# Patient Record
Sex: FEMALE | Race: ASIAN | NOT HISPANIC OR LATINO | URBAN - METROPOLITAN AREA
[De-identification: names, ages, dates, MRNs, and addresses within clinical notes are randomized per-mention and may not be internally consistent; named-entity substitution may affect disease eponyms.]

---

## 2018-01-11 ENCOUNTER — OUTPATIENT (OUTPATIENT)
Dept: OUTPATIENT SERVICES | Facility: HOSPITAL | Age: 23
LOS: 1 days | End: 2018-01-11
Payer: COMMERCIAL

## 2018-01-11 DIAGNOSIS — Z00.00 ENCOUNTER FOR GENERAL ADULT MEDICAL EXAMINATION WITHOUT ABNORMAL FINDINGS: ICD-10-CM

## 2018-01-11 PROCEDURE — 84478 ASSAY OF TRIGLYCERIDES: CPT

## 2018-01-11 PROCEDURE — 80053 COMPREHEN METABOLIC PANEL: CPT

## 2018-01-11 PROCEDURE — 85027 COMPLETE CBC AUTOMATED: CPT

## 2018-01-11 PROCEDURE — 82465 ASSAY BLD/SERUM CHOLESTEROL: CPT

## 2018-01-11 PROCEDURE — 83718 ASSAY OF LIPOPROTEIN: CPT

## 2018-01-11 PROCEDURE — 83721 ASSAY OF BLOOD LIPOPROTEIN: CPT

## 2019-05-22 PROBLEM — Z00.00 ENCOUNTER FOR PREVENTIVE HEALTH EXAMINATION: Status: ACTIVE | Noted: 2019-05-22

## 2019-05-23 ENCOUNTER — APPOINTMENT (OUTPATIENT)
Dept: PEDIATRIC ORTHOPEDIC SURGERY | Facility: CLINIC | Age: 24
End: 2019-05-23
Payer: COMMERCIAL

## 2019-05-23 DIAGNOSIS — Z78.9 OTHER SPECIFIED HEALTH STATUS: ICD-10-CM

## 2019-05-23 DIAGNOSIS — M54.9 DORSALGIA, UNSPECIFIED: ICD-10-CM

## 2019-05-23 DIAGNOSIS — M41.125 ADOLESCENT IDIOPATHIC SCOLIOSIS, THORACOLUMBAR REGION: ICD-10-CM

## 2019-05-23 DIAGNOSIS — Z00.00 ENCOUNTER FOR GENERAL ADULT MEDICAL EXAMINATION W/OUT ABNORMAL FINDINGS: ICD-10-CM

## 2019-05-23 PROCEDURE — 72082 X-RAY EXAM ENTIRE SPI 2/3 VW: CPT

## 2019-05-23 PROCEDURE — 99204 OFFICE O/P NEW MOD 45 MIN: CPT | Mod: 25

## 2019-05-27 NOTE — PHYSICAL EXAM
[FreeTextEntry1] : General: Patient is awake and alert and in no acute distress . oriented to person, place, and time. well developed, well nourished, cooperative. \par \par Skin: The skin is intact, warm, pink, and dry over the area examined.  \par \par Eyes: normal conjunctiva, normal eyelids and pupils were equal and round. \par \par ENT: normal ears, normal nose and normal lips.\par \par Cardiovascular: There is brisk capillary refill in the digits of the affected extremity. They are symmetric pulses in the bilateral upper and lower extremities, positive peripheral pulses, brisk capillary refill, but no peripheral edema.\par \par Respiratory: The patient is in no apparent respiratory distress. They're taking full deep breaths without use of accessory muscles or evidence of audible wheezes or stridor without the use of a stethoscope, normal respiratory effort. \par \par Neurological: 5/5 motor strength in the main muscle groups of bilateral lower extremities, sensory intact in bilateral lower extremities. \par \par Musculoskeletal:. Examination of both the upper and lower extremities did not show any obvious abnormality.  There is no gross deformity.  Patient has full range of motion of both the hips, knees, ankles, wrists, elbows, and shoulders.  Neck range of motion is full and free without any pain or spasm.  \par \par Examination of the back reveals relatively level shoulders.  The pelvis is asymmetric with right hip significantly higher.  On forward bending, right thoracic prominence and left thoracolumbar prominence noted.  Patient is able to bend forward and touch the toes as well bend backwards without pain.  Lateral flexion is symmetrical and is pain free.  Straight leg raising test is free to more than 70 degrees. \par \par Neurological examination reveals a grade 5/5 muscle power.  Sensation is intact to crude touch and pinprick.  Deep tendon reflexes are 1+ with ankle jerk and knee jerk.  The plantars are bilaterally down going.  Superficial abdominal reflexes are symmetric and intact.  The biceps and triceps reflexes are 1+.  \par  \par There is no hairy patch, lipoma, sinus in the back.  There is no pes cavus, asymmetry of calves, significant leg length discrepancy or significant cafe-au-lait spots.\par \par Child is able to walk on tiptoes as well as heels without difficulty or pain. Child is able to jump and squat without difficulty.\par \par  \par

## 2019-05-27 NOTE — ASSESSMENT
[FreeTextEntry1] : Patient is 23 years of age, With scoliosis and back pain.  Options of observation with likely continue to progress and do to its magnitude have been discussed. The options of surgery were discussed. Patient understands that curves larger than 50°, based on natural history, tend to progress 1-2° /1 in adult life. Curves 90° or more can cause significant cardiac and pulmonary compromise. Large curves are likely at risk for back pain, the arthritis in her adult life. Surgical procedure discussed at length. Preoperative and postoperative instructions were reviewed. Preoperative workup also discussed. As for back pain, I have recommended daily back and core strengthening and postural support. Prescription for physical therapy provided. Activities as tolerated. She will call my office if she wishes to proceed with surgery.All questions answered, understanding verbalized. Parent and patient in agreement with plan of care.\par \par I, Melissa Villasenor, have acted as a scribe and documented the above information for Dr. Juarez\par \par The above documentation completed by the scribe is an accurate record of both my words and actions.\par

## 2019-05-27 NOTE — DATA REVIEWED
[de-identified] : AP and lateral full spine xr done today Revealing right thoracic curve measuring about 45° and left thoracolumbar curve measuring about 40°. Risser 5

## 2019-05-27 NOTE — HISTORY OF PRESENT ILLNESS
[Worsening] : worsening [4] : currently ~his/her~ pain is 4 out of 10 [Sitting] : sitting [Standing] : standing [Lifting] : lifting [FreeTextEntry1] : 23-year-old female, otherwise healthy presents today for evaluation of back pain. She reports scoliosis diagnosed in adolescence, treated by Dr. Guthrie. She was treated with brace for many yrs. She reports Curve measuring about 50°.She reports worsening of back pain particularly after working out at the gym, lifting weights and after working. She stands for about 6-7 hours per day at work. She reports pain left mid back and occasionally in the neck. She does not take pain medication. She denies extremity numbness, bowel or bladder dysfunction.

## 2019-05-27 NOTE — REASON FOR VISIT
[Initial Evaluation] : an initial evaluation [Patient] : patient [Mother] : mother [FreeTextEntry1] : Back pain, scoliosis

## 2019-11-27 ENCOUNTER — APPOINTMENT (OUTPATIENT)
Dept: SPINE | Facility: CLINIC | Age: 24
End: 2019-11-27
Payer: COMMERCIAL

## 2019-11-27 VITALS
BODY MASS INDEX: 27.6 KG/M2 | HEIGHT: 62 IN | OXYGEN SATURATION: 100 % | DIASTOLIC BLOOD PRESSURE: 66 MMHG | WEIGHT: 150 LBS | RESPIRATION RATE: 16 BRPM | SYSTOLIC BLOOD PRESSURE: 102 MMHG | HEART RATE: 65 BPM

## 2019-11-27 PROCEDURE — 99203 OFFICE O/P NEW LOW 30 MIN: CPT

## 2019-11-27 NOTE — HISTORY OF PRESENT ILLNESS
[> 3 months] : more  than 3 months [FreeTextEntry1] : Spinal Back pain [de-identified] : This is a 24 year old woman here for neurosurgical evaluation of scoliosis. She comes to Visit with her mother. She reports that she was initially diagnosed with scoliosis at age 11 and wore a prescribe BACK BRACE until age 19 without any major issues. She reports that her new job demands prolonged standing and her back symptoms began to accelerate. She denies any leg weakness or difficulty walking.

## 2019-11-27 NOTE — RESULTS/DATA
[FreeTextEntry1] : cervical-MILD ROTOSCOLIOSIS CONVEX LEFT CENTERED IN THE MID CERVICAL SPINE\par thoracic-35 DEGREE ROROSCOLIOSIS CONVEX RIGHT CENTERED IN THE MID THORACIC SPINE WITH NO SEGMENTATION OR INTRASPINAL ANOMALY\par lumbar- DEGENERATIVE ROTOSCOLIOSIS CONVEX LEFT CENTERED AT THE MID LUMBAR SPINE WITH NO SEGMENTAL OR INTTRASPINAL ANOMALY\par \par see report for details

## 2019-11-27 NOTE — ASSESSMENT
[FreeTextEntry1] : NO sURGERY RECOMMENDED AT THIS TIME.\par Follow up as needed.\par Education provided regarding plan of care.\par

## 2019-11-27 NOTE — DATA REVIEWED
[de-identified] : CERVICAL, THORACIC and LUMBAR 7/22/19 [facility] (See Report fir details)

## 2019-11-27 NOTE — PHYSICAL EXAM
[General Appearance - Alert] : alert [General Appearance - In No Acute Distress] : in no acute distress [Oriented To Time, Place, And Person] : oriented to person, place, and time [I: Normal Smell] : smell intact bilaterally [Cranial Nerves Optic (II)] : visual acuity intact bilaterally,  pupils equal round and reactive to light [Cranial Nerves Oculomotor (III)] : extraocular motion intact [Cranial Nerves Trigeminal (V)] : facial sensation intact symmetrically [Cranial Nerves Facial (VII)] : face symmetrical [Cranial Nerves Vestibulocochlear (VIII)] : hearing was intact bilaterally [Cranial Nerves Glossopharyngeal (IX)] : tongue and palate midline [Cranial Nerves Accessory (XI - Cranial And Spinal)] : head turning and shoulder shrug symmetric [Cranial Nerves Hypoglossal (XII)] : there was no tongue deviation with protrusion [Motor Tone] : muscle tone was normal in all four extremities [Motor Strength] : muscle strength was normal in all four extremities [Involuntary Movements] : no involuntary movements were seen [Sensation Tactile Decrease] : light touch was intact [Balance] : balance was intact [2+] : Patella left 2+ [Deformity] : deformity [Sclera] : the sclera and conjunctiva were normal [Outer Ear] : the ears and nose were normal in appearance [Neck Appearance] : the appearance of the neck was normal [Heart Rate And Rhythm] : heart rate was normal and rhythm regular [Abdomen Soft] : soft [Abnormal Walk] : normal gait [Skin Color & Pigmentation] : normal skin color and pigmentation

## 2020-01-04 ENCOUNTER — EMERGENCY (EMERGENCY)
Facility: HOSPITAL | Age: 25
LOS: 1 days | Discharge: HOME | End: 2020-01-04
Attending: EMERGENCY MEDICINE | Admitting: EMERGENCY MEDICINE
Payer: COMMERCIAL

## 2020-01-04 VITALS
HEIGHT: 62 IN | SYSTOLIC BLOOD PRESSURE: 109 MMHG | HEART RATE: 82 BPM | WEIGHT: 149.91 LBS | OXYGEN SATURATION: 99 % | RESPIRATION RATE: 16 BRPM | DIASTOLIC BLOOD PRESSURE: 74 MMHG | TEMPERATURE: 98 F

## 2020-01-04 PROCEDURE — 40804 REMOVAL FOREIGN BODY MOUTH: CPT

## 2020-01-04 PROCEDURE — 99283 EMERGENCY DEPT VISIT LOW MDM: CPT

## 2020-01-04 PROCEDURE — 99283 EMERGENCY DEPT VISIT LOW MDM: CPT | Mod: 25

## 2020-01-04 PROCEDURE — 40804 REMOVAL FOREIGN BODY MOUTH: CPT | Mod: LT

## 2020-01-04 RX ORDER — NEOMYCIN/POLYMYXIN B/HYDROCORT
2 SUSPENSION, DROPS(FINAL DOSAGE FORM)(ML) OTIC (EAR)
Qty: 1 | Refills: 0
Start: 2020-01-04 | End: 2020-01-13

## 2020-01-04 NOTE — ED ADULT NURSE NOTE - OBJECTIVE STATEMENT
patient came in ED from home brought in by Mom with c/o bleeding from the left ear since last night. patient states she came from [place] yesterday and when she went to bed last night noted slight bleeding from the left ear.

## 2020-01-04 NOTE — ED PROVIDER NOTE - NS HIV RISK FACTOR YES
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     
Declined

## 2020-01-04 NOTE — ED PROVIDER NOTE - OBJECTIVE STATEMENT
23 yo female with few days of congestion and slight sore throat, awoke with dull left ear with pain, used Q-Tip and started to bleed from ear so came here for evaluation. No headache. No fever or chills. No neck pain. No cough.

## 2020-01-04 NOTE — ED PROVIDER NOTE - PATIENT PORTAL LINK FT
You can access the FollowMyHealth Patient Portal offered by [facility] by registering at the following website: [email or web address]. By joining [facility]’s FollowMyHealth portal, you will also be able to view your health information using other applications (apps) compatible with our system.

## 2020-01-04 NOTE — ED PROVIDER NOTE - ENMT, MLM
Airway patent. External canal on left with blood clot>>>>>post removal revealed dull TM, No Perforation. External canal edematous. Pharynx normal.

## 2020-01-04 NOTE — ED PROVIDER NOTE - CONSTITUTIONAL, MLM
normal... Well appearing, female, awake, alert, oriented to person, place, time/situation and in no apparent distress.

## 2020-01-04 NOTE — ED ADULT NURSE NOTE - NSIMPLEMENTINTERV_GEN_ALL_ED
Implemented All Fall with Harm Risk Interventions:  [place] to call system. Call bell, personal items and telephone within reach. Instruct patient to call for assistance. Room bathroom lighting operational. Non-slip footwear when patient is off stretcher. Physically safe environment: no spills, clutter or unnecessary equipment. Stretcher in lowest position, wheels locked, appropriate side rails in place. Provide visual cue, wrist band, yellow gown, etc. Monitor gait and stability. Monitor for mental status changes and reorient to person, place, and time. Review medications for side effects contributing to fall risk. Reinforce activity limits and safety measures with patient and family. Provide visual clues: red socks.

## 2020-01-04 NOTE — ED PROVIDER NOTE - CARE PROVIDER_API CALL
Macario Mcdowell)  Facial Plastic and Reconstructive Surgery; Otolaryngology  [address] [place] [address]  Phone: [phone]  Fax: [phone]  Follow Up Time:

## 2020-01-04 NOTE — ED PROVIDER NOTE - NSFOLLOWUPINSTRUCTIONS_ED_ALL_ED_FT
Rest  Augmentin 1-tablet every 12-hours for the next 10-days  Corticosporin Ear Drops, 2-drops to left ear every 8-hours for the next 10-days  Follow-up with your doctor this week  Return here if needed

## 2020-11-03 ENCOUNTER — TRANSCRIPTION ENCOUNTER (OUTPATIENT)
Age: 25
End: 2020-11-03

## 2021-09-01 ENCOUNTER — TRANSCRIPTION ENCOUNTER (OUTPATIENT)
Age: 26
End: 2021-09-01

## 2021-09-01 ENCOUNTER — OUTPATIENT (OUTPATIENT)
Dept: OUTPATIENT SERVICES | Facility: HOSPITAL | Age: 26
LOS: 1 days | End: 2021-09-01
Payer: COMMERCIAL

## 2021-09-01 DIAGNOSIS — R79.89 OTHER SPECIFIED ABNORMAL FINDINGS OF BLOOD CHEMISTRY: ICD-10-CM

## 2021-09-01 DIAGNOSIS — R07.9 CHEST PAIN, UNSPECIFIED: ICD-10-CM

## 2021-09-01 DIAGNOSIS — Z00.00 ENCOUNTER FOR GENERAL ADULT MEDICAL EXAMINATION WITHOUT ABNORMAL FINDINGS: ICD-10-CM

## 2021-09-01 DIAGNOSIS — Z01.818 ENCOUNTER FOR OTHER PREPROCEDURAL EXAMINATION: ICD-10-CM

## 2021-09-01 DIAGNOSIS — E55.9 VITAMIN D DEFICIENCY, UNSPECIFIED: ICD-10-CM

## 2021-09-01 PROCEDURE — 80053 COMPREHEN METABOLIC PANEL: CPT

## 2021-09-01 PROCEDURE — 84443 ASSAY THYROID STIM HORMONE: CPT

## 2021-09-01 PROCEDURE — 82306 VITAMIN D 25 HYDROXY: CPT

## 2021-09-01 PROCEDURE — 80061 LIPID PANEL: CPT

## 2021-09-01 PROCEDURE — 82607 VITAMIN B-12: CPT

## 2021-09-01 PROCEDURE — 85025 COMPLETE CBC W/AUTO DIFF WBC: CPT

## 2021-09-01 PROCEDURE — 84436 ASSAY OF TOTAL THYROXINE: CPT

## 2021-09-01 PROCEDURE — 82746 ASSAY OF FOLIC ACID SERUM: CPT

## 2021-09-01 PROCEDURE — 81001 URINALYSIS AUTO W/SCOPE: CPT

## 2021-09-01 PROCEDURE — 84480 ASSAY TRIIODOTHYRONINE (T3): CPT

## 2021-09-01 PROCEDURE — 36415 COLL VENOUS BLD VENIPUNCTURE: CPT

## 2022-07-17 ENCOUNTER — NON-APPOINTMENT (OUTPATIENT)
Age: 27
End: 2022-07-17

## 2022-07-19 ENCOUNTER — EMERGENCY (EMERGENCY)
Facility: HOSPITAL | Age: 27
LOS: 1 days | Discharge: HOME | End: 2022-07-19
Attending: EMERGENCY MEDICINE | Admitting: EMERGENCY MEDICINE
Payer: COMMERCIAL

## 2022-07-19 VITALS
RESPIRATION RATE: 16 BRPM | OXYGEN SATURATION: 97 % | SYSTOLIC BLOOD PRESSURE: 100 MMHG | DIASTOLIC BLOOD PRESSURE: 79 MMHG | WEIGHT: 160.06 LBS | HEART RATE: 83 BPM | TEMPERATURE: 98 F | HEIGHT: 62 IN

## 2022-07-19 PROCEDURE — 99282 EMERGENCY DEPT VISIT SF MDM: CPT

## 2022-07-19 NOTE — ED ADULT TRIAGE NOTE - INTERNATIONAL TRAVEL
Kwabena Rodriguez called requesting a call back  She would like to schedule with another provider    [phone]
No

## 2022-07-19 NOTE — ED PROVIDER NOTE - NSFOLLOWUPINSTRUCTIONS_ED_ALL_ED_FT
1) Follow-up with your Primary Medical Doctor or referred doctor. Call today / next business day for prompt follow-up.  2) Return to Emergency room for any worsening or persistent pain, weakness, fever, dizziness, passing out, difficulty breathing or any other concerning symptoms.  3) See attached instruction sheets for additional information, including information regarding signs and symptoms to look out for, reasons to seek immediate care and other important instructions.  4) Continue your ear drops and your zithromax as prescribued  5) Follow-up with ENT as discussed

## 2022-07-19 NOTE — ED PROVIDER NOTE - PATIENT PORTAL LINK FT
You can access the FollowMyHealth Patient Portal offered by [facility] by registering at the following website: [email or web address]. By joining [facility]’s FollowMyHealth portal, you will also be able to view your health information using other applications (apps) compatible with our system.

## 2022-07-19 NOTE — ED ADULT TRIAGE NOTE - PAIN RATING/NUMBER SCALE (0-10): REST
[Normal] : affect was normal and insight and judgment were intact [de-identified] : mild maxillary sinus pressure, boggy nasal mucosa, mod pharyngeal erythema 4

## 2022-07-19 NOTE — ED PROVIDER NOTE - CHPI ED SYMPTOMS NEG
no blurred vision/no fever/no loss of consciousness/no syncope/no vomiting/no weakness/no change in level of consciousness/no chills

## 2022-07-19 NOTE — ED PROVIDER NOTE - RECENT EXPOSURE TO
Subjective:      S:  Lindsay Coulter is a 37 y.o.  female  @ She is 14w6d with an CROW of Estimated Date of Delivery: 21 based off of US who presents for her new OB exam.      Her OB hx includes  x1 in .   History of HSV I or II in self or partner: no  History of STIs in self or partner: no  History of Thyroid problems: no    NO ER visits or previous care in this pregnancy. She has no complaints.  Desires AFP.  Declines CF.Will wait for results fo AFP and US before deciding on MFM referral for AMA.  Reports no FM, VB, LOF, or cramping.  Denies dysuria, vaginal DC.  Pt is  and lives with family. FOB is involved. She is currently working as , discussed heavy lifting and chemical exposure.  Pregnancy is planned.    O:    Vitals:    10/29/20 1331   BP: 110/64   Weight: 78.7 kg (173 lb 9.6 oz)    See H&P Prenatal Physical.  Wet mount: not indicated        FHTs: 160        Fundal ht: 15cm     A:   1.  IUP @ 14w6d CROW: Estimated Date of Delivery: 21 per LMP         2.  S=D        3.    Patient Active Problem List    Diagnosis Date Noted   • Normal labor 2013   • GBS (group B streptococcus) UTI complicating pregnancy 2013   • Supervision of normal first pregnancy 2013         P:  1.  GC/CT done. Pap done.         2.  Prenatal labs and UDS ordered - lab slip given        3.  Discussed diet and exercise during pregnancy. Encouraged good nutrition, and daily exercise including walking or swimming. Discussed expected weight gain during pregnancy.              4.  Discussed adequate hydration during pregnancy.        5.  NOB packet given        6.  Return to office in 4 wks        7.  Complete OB US in 5-6 wks        8.  Pregnancy guide not available        9.  Childbirth education discussed. Not a candidate for Centering Pregnancy       10. Flu vaccine today    HPI    Review of Systems   Constitutional: Negative.    HENT: Negative.    Eyes: Negative.     Respiratory: Negative.    Cardiovascular: Negative.    Gastrointestinal: Negative.    Genitourinary: Negative.    Musculoskeletal: Negative.    Skin: Negative.    Neurological: Negative.    Endo/Heme/Allergies: Negative.    Psychiatric/Behavioral: Negative.           Objective:     /64   Wt 78.7 kg (173 lb 9.6 oz)   LMP 07/17/2020 (Exact Date)   BMI 29.80 kg/m²      Physical Exam  Vitals signs and nursing note reviewed.   Constitutional:       Appearance: She is well-developed.   Neck:      Musculoskeletal: Normal range of motion and neck supple.   Cardiovascular:      Rate and Rhythm: Normal rate and regular rhythm.      Heart sounds: Normal heart sounds.   Pulmonary:      Effort: Pulmonary effort is normal.      Breath sounds: Normal breath sounds.   Abdominal:      Palpations: Abdomen is soft.   Genitourinary:     Vagina: Normal.      Comments: Uterus enlarged, c/w 15 wk ga  Musculoskeletal: Normal range of motion.   Skin:     General: Skin is warm and dry.   Neurological:      Mental Status: She is alert and oriented to person, place, and time.      Deep Tendon Reflexes: Reflexes are normal and symmetric.   Psychiatric:         Behavior: Behavior normal.         Thought Content: Thought content normal.         Judgment: Judgment normal.                 Assessment/Plan:        1. Encounter for supervision of other normal pregnancy in second trimester    - THINPREP PAP W/HPV AND CTNG; Future  - US-OB 2ND 3RD TRI COMPLETE; Future  - URINE DRUG SCREEN W/CONF (AR); Future  - PRENATAL PANEL 3+HIV+HCV; Future  - AFP TETRA; Future  - HEP C VIRUS ANTIBODY; Future  - Influenza Vaccine Quad Injection (PF)    2. Encounter for supervision of other normal pregnancy in first trimester    - POCT Urinalysis     none known

## 2022-07-19 NOTE — ED PROVIDER NOTE - OBJECTIVE STATEMENT
27-year-old female with no significant past medical history presents with resolving COVID infection.  Patient has had URI symptoms for past 6 days.  Patient states that is much improved now.  Patient has been having left ear pain over the past 2 days.  Patient was started on oral Zithromax, and topical Cortisporin.  Patient complains of feeling of clogged left ear status post putting the eardrops into the ear.  No acute pain to the ear.  No fevers or chills.  No chest pain or shortness of breath.  No weakness or dizziness.  No other acute complaints at this time.  Patient is vaccinated for COVID.

## 2022-07-19 NOTE — ED PROVIDER NOTE - CARE PROVIDER_API CALL
Rosalia Gallagher ()  Internal Medicine  [address] [place] [address]  Phone: [phone]  Fax: [phone]  Follow Up Time:     Jun Saldivar)  [facility] [address]  [place] [address]  Phone: [phone]  Fax: [phone]  Follow Up Time:

## 2022-07-19 NOTE — ED PROVIDER NOTE - CLINICAL SUMMARY MEDICAL DECISION MAKING FREE TEXT BOX
Left side otitis externa just recently started on antibiotic drops.  Patient also on oral antibiotics.  Patient with resolving COVID.  Patient to continue medications as prescribed, outpatient follow-up with ENT.

## 2022-07-19 NOTE — ED PROVIDER NOTE - ENMT, MLM
Airway patent, Nasal mucosa clear. Mouth with normal mucosa. Throat has no vesicles, no oropharyngeal exudates and uvula is midline.  R TM partially clogged, no acute infxn. L TM not visualized, diffuse otitis externa, although open. Nl mastoid nl.

## 2022-07-19 NOTE — ED PROVIDER NOTE - CARE PROVIDERS DIRECT ADDRESSES
,[email or web address]

## 2022-10-10 DIAGNOSIS — U07.1 COVID-19: ICD-10-CM

## 2023-01-18 ENCOUNTER — APPOINTMENT (OUTPATIENT)
Dept: OTOLARYNGOLOGY | Facility: CLINIC | Age: 28
End: 2023-01-18
Payer: COMMERCIAL

## 2023-01-18 ENCOUNTER — NON-APPOINTMENT (OUTPATIENT)
Age: 28
End: 2023-01-18

## 2023-01-18 VITALS
HEIGHT: 62 IN | BODY MASS INDEX: 33.13 KG/M2 | HEART RATE: 91 BPM | WEIGHT: 180 LBS | SYSTOLIC BLOOD PRESSURE: 106 MMHG | DIASTOLIC BLOOD PRESSURE: 71 MMHG

## 2023-01-18 DIAGNOSIS — L30.9 DERMATITIS, UNSPECIFIED: ICD-10-CM

## 2023-01-18 DIAGNOSIS — H61.20 IMPACTED CERUMEN, UNSPECIFIED EAR: ICD-10-CM

## 2023-01-18 PROCEDURE — 69210 REMOVE IMPACTED EAR WAX UNI: CPT

## 2023-01-18 PROCEDURE — 99204 OFFICE O/P NEW MOD 45 MIN: CPT | Mod: 25

## 2023-01-18 NOTE — REVIEW OF SYSTEMS
[Seasonal Allergies] : seasonal allergies [Ear Pain] : ear pain [Ear Itch] : ear itch [Negative] : Heme/Lymph [Patient Intake Form Reviewed] : Patient intake form was reviewed

## 2023-01-18 NOTE — PHYSICAL EXAM
[de-identified] : LU IMPACTED CERUMEN REMOVED/ CANAL IRRITATED/ ECZEMA [Normal] : mucosa is normal [Midline] : trachea located in midline position

## 2023-01-19 RX ORDER — MOMETASONE FUROATE 1 MG/ML
0.1 SOLUTION TOPICAL
Qty: 1 | Refills: 3 | Status: ACTIVE | COMMUNITY
Start: 2023-01-19 | End: 1900-01-01

## 2023-05-22 ENCOUNTER — EMERGENCY (EMERGENCY)
Facility: HOSPITAL | Age: 28
LOS: 1 days | Discharge: PSYCHIATRIC OR SUBSTANCE USE FACILITY | End: 2023-05-22
Attending: EMERGENCY MEDICINE | Admitting: EMERGENCY MEDICINE
Payer: COMMERCIAL

## 2023-05-22 VITALS
HEART RATE: 100 BPM | TEMPERATURE: 98 F | SYSTOLIC BLOOD PRESSURE: 114 MMHG | DIASTOLIC BLOOD PRESSURE: 79 MMHG | RESPIRATION RATE: 20 BRPM | OXYGEN SATURATION: 97 %

## 2023-05-22 VITALS
OXYGEN SATURATION: 97 % | HEART RATE: 101 BPM | RESPIRATION RATE: 19 BRPM | HEIGHT: 62 IN | TEMPERATURE: 98 F | SYSTOLIC BLOOD PRESSURE: 114 MMHG | DIASTOLIC BLOOD PRESSURE: 71 MMHG | WEIGHT: 191.36 LBS

## 2023-05-22 DIAGNOSIS — F32.A DEPRESSION, UNSPECIFIED: ICD-10-CM

## 2023-05-22 LAB
ALBUMIN SERPL ELPH-MCNC: 3.6 G/DL — SIGNIFICANT CHANGE UP (ref 3.3–5)
ALP SERPL-CCNC: 81 U/L — SIGNIFICANT CHANGE UP (ref 30–120)
ALT FLD-CCNC: 22 U/L DA — SIGNIFICANT CHANGE UP (ref 10–60)
AMPHET UR-MCNC: NEGATIVE — SIGNIFICANT CHANGE UP
ANION GAP SERPL CALC-SCNC: 11 MMOL/L — SIGNIFICANT CHANGE UP (ref 5–17)
APAP SERPL-MCNC: <1 UG/ML — LOW (ref 10–30)
APPEARANCE UR: ABNORMAL
AST SERPL-CCNC: 17 U/L — SIGNIFICANT CHANGE UP (ref 10–40)
BACTERIA # UR AUTO: ABNORMAL /HPF
BARBITURATES UR SCN-MCNC: NEGATIVE — SIGNIFICANT CHANGE UP
BASE EXCESS BLDV CALC-SCNC: -6.2 MMOL/L — LOW (ref -2–3)
BASOPHILS # BLD AUTO: 0.03 K/UL — SIGNIFICANT CHANGE UP (ref 0–0.2)
BASOPHILS NFR BLD AUTO: 0.3 % — SIGNIFICANT CHANGE UP (ref 0–2)
BENZODIAZ UR-MCNC: NEGATIVE — SIGNIFICANT CHANGE UP
BILIRUB SERPL-MCNC: 0.2 MG/DL — SIGNIFICANT CHANGE UP (ref 0.2–1.2)
BILIRUB UR-MCNC: NEGATIVE — SIGNIFICANT CHANGE UP
BLOOD GAS COMMENTS, VENOUS: SIGNIFICANT CHANGE UP
BUN SERPL-MCNC: 10 MG/DL — SIGNIFICANT CHANGE UP (ref 7–23)
CALCIUM SERPL-MCNC: 9.1 MG/DL — SIGNIFICANT CHANGE UP (ref 8.4–10.5)
CHLORIDE SERPL-SCNC: 105 MMOL/L — SIGNIFICANT CHANGE UP (ref 96–108)
CO2 SERPL-SCNC: 24 MMOL/L — SIGNIFICANT CHANGE UP (ref 22–31)
COCAINE METAB.OTHER UR-MCNC: NEGATIVE — SIGNIFICANT CHANGE UP
COLOR SPEC: YELLOW — SIGNIFICANT CHANGE UP
CREAT SERPL-MCNC: 0.85 MG/DL — SIGNIFICANT CHANGE UP (ref 0.5–1.3)
DIFF PNL FLD: ABNORMAL
EGFR: 97 ML/MIN/1.73M2 — SIGNIFICANT CHANGE UP
EOSINOPHIL # BLD AUTO: 0.15 K/UL — SIGNIFICANT CHANGE UP (ref 0–0.5)
EOSINOPHIL NFR BLD AUTO: 1.5 % — SIGNIFICANT CHANGE UP (ref 0–6)
EPI CELLS # UR: PRESENT
ETHANOL SERPL-MCNC: <3 MG/DL — SIGNIFICANT CHANGE UP (ref 0–3)
GAS PNL BLDV: SIGNIFICANT CHANGE UP
GLUCOSE SERPL-MCNC: 130 MG/DL — HIGH (ref 70–99)
GLUCOSE UR QL: NEGATIVE MG/DL — SIGNIFICANT CHANGE UP
HCG UR QL: NEGATIVE — SIGNIFICANT CHANGE UP
HCO3 BLDV-SCNC: 19 MMOL/L — LOW (ref 22–29)
HCT VFR BLD CALC: 39.4 % — SIGNIFICANT CHANGE UP (ref 34.5–45)
HGB BLD-MCNC: 12.9 G/DL — SIGNIFICANT CHANGE UP (ref 11.5–15.5)
IMM GRANULOCYTES NFR BLD AUTO: 0.4 % — SIGNIFICANT CHANGE UP (ref 0–0.9)
KETONES UR-MCNC: 15 MG/DL
LEUKOCYTE ESTERASE UR-ACNC: ABNORMAL
LYMPHOCYTES # BLD AUTO: 1.61 K/UL — SIGNIFICANT CHANGE UP (ref 1–3.3)
LYMPHOCYTES # BLD AUTO: 15.9 % — SIGNIFICANT CHANGE UP (ref 13–44)
MCHC RBC-ENTMCNC: 28.2 PG — SIGNIFICANT CHANGE UP (ref 27–34)
MCHC RBC-ENTMCNC: 32.7 GM/DL — SIGNIFICANT CHANGE UP (ref 32–36)
MCV RBC AUTO: 86.2 FL — SIGNIFICANT CHANGE UP (ref 80–100)
METHADONE UR-MCNC: NEGATIVE — SIGNIFICANT CHANGE UP
MONOCYTES # BLD AUTO: 0.49 K/UL — SIGNIFICANT CHANGE UP (ref 0–0.9)
MONOCYTES NFR BLD AUTO: 4.8 % — SIGNIFICANT CHANGE UP (ref 2–14)
NEUTROPHILS # BLD AUTO: 7.8 K/UL — HIGH (ref 1.8–7.4)
NEUTROPHILS NFR BLD AUTO: 77.1 % — HIGH (ref 43–77)
NITRITE UR-MCNC: NEGATIVE — SIGNIFICANT CHANGE UP
NRBC # BLD: 0 /100 WBCS — SIGNIFICANT CHANGE UP (ref 0–0)
OPIATES UR-MCNC: NEGATIVE — SIGNIFICANT CHANGE UP
PCO2 BLDV: 34 MMHG — LOW (ref 39–42)
PCP SPEC-MCNC: SIGNIFICANT CHANGE UP
PCP UR-MCNC: NEGATIVE — SIGNIFICANT CHANGE UP
PH BLDV: 7.36 — SIGNIFICANT CHANGE UP (ref 7.32–7.43)
PH UR: 6 — SIGNIFICANT CHANGE UP (ref 5–8)
PLATELET # BLD AUTO: 374 K/UL — SIGNIFICANT CHANGE UP (ref 150–400)
PO2 BLDV: 97 MMHG — HIGH (ref 25–45)
POTASSIUM SERPL-MCNC: 4 MMOL/L — SIGNIFICANT CHANGE UP (ref 3.5–5.3)
POTASSIUM SERPL-SCNC: 4 MMOL/L — SIGNIFICANT CHANGE UP (ref 3.5–5.3)
PROT SERPL-MCNC: 8.3 G/DL — SIGNIFICANT CHANGE UP (ref 6–8.3)
PROT UR-MCNC: 30 MG/DL
RBC # BLD: 4.57 M/UL — SIGNIFICANT CHANGE UP (ref 3.8–5.2)
RBC # FLD: 12 % — SIGNIFICANT CHANGE UP (ref 10.3–14.5)
RBC CASTS # UR COMP ASSIST: 1 /HPF — SIGNIFICANT CHANGE UP (ref 0–4)
SALICYLATES SERPL-MCNC: 1 MG/DL — LOW (ref 2.8–20)
SAO2 % BLDV: 98.2 % — HIGH (ref 67–88)
SARS-COV-2 RNA SPEC QL NAA+PROBE: SIGNIFICANT CHANGE UP
SODIUM SERPL-SCNC: 140 MMOL/L — SIGNIFICANT CHANGE UP (ref 135–145)
SP GR SPEC: 1.03 — SIGNIFICANT CHANGE UP (ref 1–1.03)
THC UR QL: NEGATIVE — SIGNIFICANT CHANGE UP
UROBILINOGEN FLD QL: 0.2 MG/DL — SIGNIFICANT CHANGE UP (ref 0.2–1)
WBC # BLD: 10.12 K/UL — SIGNIFICANT CHANGE UP (ref 3.8–10.5)
WBC # FLD AUTO: 10.12 K/UL — SIGNIFICANT CHANGE UP (ref 3.8–10.5)
WBC UR QL: 3 /HPF — SIGNIFICANT CHANGE UP (ref 0–5)

## 2023-05-22 PROCEDURE — 99285 EMERGENCY DEPT VISIT HI MDM: CPT

## 2023-05-22 PROCEDURE — 81025 URINE PREGNANCY TEST: CPT

## 2023-05-22 PROCEDURE — 96375 TX/PRO/DX INJ NEW DRUG ADDON: CPT

## 2023-05-22 PROCEDURE — 82803 BLOOD GASES ANY COMBINATION: CPT

## 2023-05-22 PROCEDURE — 96361 HYDRATE IV INFUSION ADD-ON: CPT

## 2023-05-22 PROCEDURE — 36415 COLL VENOUS BLD VENIPUNCTURE: CPT

## 2023-05-22 PROCEDURE — 96374 THER/PROPH/DIAG INJ IV PUSH: CPT

## 2023-05-22 PROCEDURE — 87635 SARS-COV-2 COVID-19 AMP PRB: CPT

## 2023-05-22 PROCEDURE — 81001 URINALYSIS AUTO W/SCOPE: CPT

## 2023-05-22 PROCEDURE — 80307 DRUG TEST PRSMV CHEM ANLYZR: CPT

## 2023-05-22 PROCEDURE — 85025 COMPLETE CBC W/AUTO DIFF WBC: CPT

## 2023-05-22 PROCEDURE — 80053 COMPREHEN METABOLIC PANEL: CPT

## 2023-05-22 PROCEDURE — 90792 PSYCH DIAG EVAL W/MED SRVCS: CPT | Mod: 95

## 2023-05-22 PROCEDURE — 99285 EMERGENCY DEPT VISIT HI MDM: CPT | Mod: 25

## 2023-05-22 PROCEDURE — 93010 ELECTROCARDIOGRAM REPORT: CPT

## 2023-05-22 PROCEDURE — 93005 ELECTROCARDIOGRAM TRACING: CPT

## 2023-05-22 RX ORDER — ONDANSETRON 8 MG/1
4 TABLET, FILM COATED ORAL ONCE
Refills: 0 | Status: COMPLETED | OUTPATIENT
Start: 2023-05-22 | End: 2023-05-22

## 2023-05-22 RX ORDER — PANTOPRAZOLE SODIUM 20 MG/1
40 TABLET, DELAYED RELEASE ORAL ONCE
Refills: 0 | Status: COMPLETED | OUTPATIENT
Start: 2023-05-22 | End: 2023-05-22

## 2023-05-22 RX ORDER — SODIUM CHLORIDE 9 MG/ML
1000 INJECTION INTRAMUSCULAR; INTRAVENOUS; SUBCUTANEOUS ONCE
Refills: 0 | Status: COMPLETED | OUTPATIENT
Start: 2023-05-22 | End: 2023-05-22

## 2023-05-22 RX ADMIN — ONDANSETRON 4 MILLIGRAM(S): 8 TABLET, FILM COATED ORAL at 06:19

## 2023-05-22 RX ADMIN — SODIUM CHLORIDE 1000 MILLILITER(S): 9 INJECTION INTRAMUSCULAR; INTRAVENOUS; SUBCUTANEOUS at 03:05

## 2023-05-22 RX ADMIN — SODIUM CHLORIDE 1000 MILLILITER(S): 9 INJECTION INTRAMUSCULAR; INTRAVENOUS; SUBCUTANEOUS at 04:05

## 2023-05-22 RX ADMIN — SODIUM CHLORIDE 1000 MILLILITER(S): 9 INJECTION INTRAMUSCULAR; INTRAVENOUS; SUBCUTANEOUS at 08:29

## 2023-05-22 RX ADMIN — PANTOPRAZOLE SODIUM 40 MILLIGRAM(S): 20 TABLET, DELAYED RELEASE ORAL at 08:29

## 2023-05-22 NOTE — ED BEHAVIORAL HEALTH ASSESSMENT NOTE - HPI (INCLUDE ILLNESS QUALITY, SEVERITY, DURATION, TIMING, CONTEXT, MODIFYING FACTORS, ASSOCIATED SIGNS AND SYMPTOMS)
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 To note patient has no IP hospitalization,  no previous SA, no previous self harm, no legal/violence hx, no substance use hx.      Patient states she has been having a rough time over the past few days and having suicidal thoughts. She states about 24 hours ago she took 16 advils and about 8 hours ago she took 30 advils. She states she had some N but currently has no sx. She reports stressors of family, relationships, and school. She reports she feels depressed. Reports sleep, energy, appetite all vary with medical school. Reports recent SI. Reports high internal anxiety. Reports taking Zoloft 50mg for the past month. She reports she was feeling hopeless and thus restarted on Zoloft about a month ago. She is agreeable to admission.      Patient verbally consents to talk to father.

## 2023-05-22 NOTE — ED ADULT NURSE NOTE - OBJECTIVE STATEMENT
Pt p/w stating that she took 16 OTC advil pills approximately 24hrs ago, and took another 30 pills about 9 hrs ago, attempting to kill herself. Pt p/w stating that she took 16 OTC advil pills approximately 24hrs ago, and took another 30 pills about 9 hrs ago, attempting to kill herself. pt stated that she has been depressed for the past 1-2months, because of a lot of stress from school, family, "everything just happened all at the same time". pt I on sertraline, last seen psychiatrist 3 days ago, with hx of possible depression/excessive disorder.

## 2023-05-22 NOTE — ED BEHAVIORAL HEALTH ASSESSMENT NOTE - OTHER PAST PSYCHIATRIC HISTORY (INCLUDE DETAILS REGARDING ONSET, COURSE OF ILLNESS, INPATIENT/OUTPATIENT TREATMENT)
Previous Dx: Anxiety, Depression     Previous Med Trials: just zoloft     Previous IP treatment: denies     Previous SA: denies prior; this ED visit in May 2023 for OD on Advil     Self harming: denies     Current MH treatment: Dr. Leger since Nov 2021     Violence/Legal: denies

## 2023-05-22 NOTE — ED ADULT TRIAGE NOTE - CHIEF COMPLAINT QUOTE
I took 16 Advil 200mg over 24 hours ago and nothing happened around 0000 yesterday. I took about 30 pills of Advil 8 hours ago; with intentions to hurt myself. Pt following with psychiatry. I am having very bad thoughts with school and family stuff. Pt currently in med school

## 2023-05-22 NOTE — ED PROVIDER NOTE - PROGRESS NOTE DETAILS
Patient medically cleared for psychiatric admission. No medical follow up indicated for ingestion telepsych relates pt to be xferred to [facility] under dr rodrigez

## 2023-05-22 NOTE — ED BEHAVIORAL HEALTH NOTE - BEHAVIORAL HEALTH NOTE
===================     PRE-HOSPITAL COURSE     ===================     SOURCE: RN and secondhand ED documentation      DETAILS: Pt self-presented with parent      ============     ED COURSE     ============     SOURCE: RN and secondhand ED documentation      ARRIVAL: Pt self-presented with parent     BELONGINGS: No belongings of note. All belongings were provided to hospital security/dad, and patient currently in a gown with a 1:1 staff member.     BEHAVIOR: Per RN pt is calm, cooperative and in behavioral control. Pt reported to RN she is presenting due to feeling very depressed and stressed out. RN reports pt attempted suicide via OD. RN reports pt presents AOx4, good eye contact and good grooming/hygiene.      TREATMENT: Sodium Chloride 1000 ml at 3:00a     VISITORS: Mom at bedside

## 2023-05-22 NOTE — ED PROVIDER NOTE - OBJECTIVE STATEMENT
Patient presents to emergency department stating she took overdose of Advil.  Patient states she has been having thoughts of harming herself.  Patient also been going on for 1 to 2 months but she has been suppressing them.  They have become more pronounced in the last couple of days.  Patient has a history of possible depression or excessive disorder and is followed by a Dr. Riley of psychiatry.  Patient is on sertraline.  Last saw her psychiatrist 3 days ago.  Patient denies any prior suicidal attempts.  Patient is having some abdominal bloating with mild nausea but denies any other symptoms.  Patient states that she took 16 over-the-counter Advil's approximately 24 hours ago.  States she took another 30 pills approximately, around 9 hours ago.  Denies any other ingestion.  Patient told her parents that she was having the abdominal symptoms and admitted to the ingestion.  They brought her to the emergency department. Patient presents to emergency department stating she took overdose of Advil.  Patient states she has been having thoughts of harming herself.  Patient also been going on for 1 to 2 months but she has been suppressing them.  They have become more pronounced in the last couple of days.  Patient has a history of possible depression or excessive disorder and is followed by a Dr. Leger of psychiatry.  Patient is on sertraline.  Last saw her psychiatrist 3 days ago.  Patient denies any prior suicidal attempts.  Patient is having some abdominal bloating with mild nausea but denies any other symptoms.  Patient states that she took 16 over-the-counter Advil's approximately 24 hours ago.  States she took another 30 pills approximately, around 9 hours ago.  Denies any other ingestion.  Patient told her parents that she was having the abdominal symptoms and admitted to the ingestion.  They brought her to the emergency department.

## 2023-05-22 NOTE — ED ADULT NURSE NOTE - NSFALLUNIVINTERV_ED_ALL_ED
Bed/Stretcher in lowest position, wheels locked, appropriate side rails in place/Call bell, personal items and telephone in reach/Instruct patient to call for assistance before getting out of bed/chair/stretcher/Non-slip footwear applied when patient is off stretcher/[place] to call system/Physically safe environment - no spills, clutter or unnecessary equipment/Purposeful proactive rounding/Room/bathroom lighting operational, light cord in reach

## 2023-05-22 NOTE — ED ADULT NURSE REASSESSMENT NOTE - NS ED NURSE REASSESS COMMENT FT1
pt and family does not want pt to go to [facility], because many staff in [facility] go to pt and family's [employer], they don't want people to talk about them and spread rumors. pt and family requesting to be transferred to another facility when it comes up. dr weiss spoke with telepsych and made them aware of the request

## 2023-05-22 NOTE — ED BEHAVIORAL HEALTH ASSESSMENT NOTE - DESCRIPTION
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

## 2023-05-22 NOTE — ED PROVIDER NOTE - MUSCULOSKELETAL, MLM
[FreeTextEntry1] : Very pleasant 40-year-old gentleman who presents for evaluation for vasectomy counseling, microscopic hematuria\par -Patient was counseled on the risks of a vasectomy for elective sterilization. He was counseled that this is intended to be a nonreversible, permanent procedure. Procedural details were discussed with the patient at length.  Diagram used to demonstrate the procedure of a vasectomy.  We discussed that he will need to use an alternative form of contraception until a postoperative semen analysis demonstrates that he is sterile.  We had an extensive discussion regarding the risks, benefits, alternatives of a vasectomy, including bleeding, infection, acute and chronic pain.  Patient understands the risks of the procedure and wishes to proceed. [facility] consent form signed in the office. Will schedule procedure after 30 day period.\par -urinalysis reviewed–9 red blood cells per high-powered field\par -Renal ultrasound\par -cystoscopy\par -We discussed AUA guidelines regarding risk stratification for microscopic hematuria\par -Extensive discussion of the potential etiologies of hematuria, as well as the need to complete full work up for evaluation of cancer or other  sources of hematuria.  Patient understands and wishes to proceed. Spine appears normal, range of motion is not limited, no muscle or joint tenderness

## 2023-05-22 NOTE — ED ADULT TRIAGE NOTE - NS_BH TRG Q4C_ED_A_ED
Past 24 hrs

## 2023-05-22 NOTE — ED BEHAVIORAL HEALTH ASSESSMENT NOTE - SUMMARY
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 To note patient has no IP hospitalization,  no previous SA, no previous self harm, no legal/violence hx, no substance use hx.      Patient presenting to the ED after SA via OD in the setting of stressors with relationships and school. Patient with serious attempts that are concerning for safety of self. Patient at this time meets inpatient criteria for admission.       Plan:    -Admit VOL (meets involuntary if not willing to sign in) awaiting bed ; [facility]  -Continue Home Medication which includes: Zoloft 50mg qdaily

## 2023-05-22 NOTE — ED BEHAVIORAL HEALTH NOTE - BEHAVIORAL HEALTH NOTE
Pt's chart reviewed and evaluated by this writer. 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 To note patient has no IP hospitalization,  no previous SA, no previous self harm, no legal/violence hx, no substance use hx.      Patient presenting to the ED after SA via OD in the setting of stressors with relationships and school. Patient with serious attempts that are concerning for safety of self. Patient at this time meets inpatient criteria for admission."    -Transfer to [facility]  -Continue with Sertraline 50 mg Pt's chart reviewed and evaluated by this writer. On assessment, pt states she was reconsidering hospitalization since her mood improved after talking with her father, who the pt mentioned works as a . However, after this writer provided further details about the benefits of hospitalization (namely that she will be connected to outpt therapy services more quickly) and about what a hospitalization entails, the pt indicated she would still be willing to come into the hospital on a voluntary legal status. Pt denies current SI/HI/AH/VH/PI.     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 To note patient has no IP hospitalization,  no previous SA, no previous self harm, no legal/violence hx, no substance use hx.      Patient presenting to the ED after SA via OD in the setting of stressors with relationships and school. Patient with serious attempts that are concerning for safety of self. Patient at this time meets inpatient criteria for admission."    -Transfer to [facility] on a 9.13 legal status  -Continue with Sertraline 50 mg  -Maintain on 1:1 while in the ED; No need for 1:1 on the inpatient unit Pt's chart reviewed and evaluated by this writer. On assessment, pt states she was reconsidering hospitalization since her mood improved after talking with her father, who the pt mentioned works as a . However, after this writer provided further details about the benefits of hospitalization (namely that she will be connected to outpt therapy services more quickly) and about what a hospitalization entails, the pt indicated she would still be willing to come into the hospital on a voluntary legal status. Pt denies current SI/HI/AH/VH/PI. Pt requested and gave consent for this writer to speak with one of her parents to discuss the plan to transfer her to an inpatient psychiatric unit.     This writer spoke with pt's father and updated him about the plan to transfer the pt to [facility] today and father verbalized understanding and agreed with this plan.     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 To note patient has no IP hospitalization,  no previous SA, no previous self harm, no legal/violence hx, no substance use hx.      Patient presenting to the ED after SA via OD in the setting of stressors with relationships and school. Patient with serious attempts that are concerning for safety of self. Patient at this time meets inpatient criteria for admission."    -Transfer to [facility] on a 9.13 legal status  -Continue with Sertraline 50 mg  -Maintain on 1:1 while in the ED; No need for 1:1 on the inpatient unit

## 2023-05-22 NOTE — ED ADULT NURSE NOTE - FAMILY HISTORY OF PSYCHIATRIC ILLNESS / SUICIDALITY
This was just prescribed last month with refills.  If her dose has changed and she is needing a new rx, this should be requested of her endocrinologist who is the prescriber of this drug.     None known

## 2023-05-22 NOTE — ED ADULT NURSE NOTE - HPI (INCLUDE ILLNESS QUALITY, SEVERITY, DURATION, TIMING, CONTEXT, MODIFYING FACTORS, ASSOCIATED SIGNS AND SYMPTOMS)
Pt p/w stating that she took 16 OTC advil pills approximately 24hrs ago, and took another 30 pills about 9 hrs ago, attempting to kill herself. pt stated that she has been depressed for the past 1-2months, because of a lot of stress from school, family, "everything just happened all at the same time". pt I on sertraline, last seen psychiatrist 3 days ago, with hx of possible depression/excessive disorder.

## 2023-05-22 NOTE — ED BEHAVIORAL HEALTH ASSESSMENT NOTE - SOURCE OF INFORMATION
Minocycline Counseling: Patient advised regarding possible photosensitivity and discoloration of the teeth, skin, lips, tongue and gums.  Patient instructed to avoid sunlight, if possible.  When exposed to sunlight, patients should wear protective clothing, sunglasses, and sunscreen.  The patient was instructed to call the office immediately if the following severe adverse effects occur:  hearing changes, easy bruising/bleeding, severe headache, or vision changes.  The patient verbalized understanding of the proper use and possible adverse effects of minocycline.  All of the patient's questions and concerns were addressed. Patient

## 2023-05-22 NOTE — ED PROVIDER NOTE - CLINICAL SUMMARY MEDICAL DECISION MAKING FREE TEXT BOX
Patient with intentional ibuprofen overdose, and intention to harm herself. Will get labs, give IV fluids. If no sign of drug toxicity, will get psych consult

## 2023-05-23 NOTE — SOCIAL WORK PROGRESS NOTE - NSSWPROGRESSNOTE_GEN_ALL_CORE
Per chart review, patient [place] [facility] [place] [facility] on 05/22/2023 for inpatient mental health treatment[place] [facility] [place]  [place] spoke w/ Donna GUILLAUME of [employer] @ p [phone] ext. 1 > 2 > 2 > 4 who issued auth #[id number] for 10 days of inpatient mental health treatment beginning 05/22 and ending 06/01/2023. The above auth info was conveyed to receiving facility's utilization review dept via secure email, and  to remain available for any continued needs.

## 2023-06-06 NOTE — ED ADULT NURSE NOTE - AS SC BRADEN ACTIVITY
Arouses to touch and is able to make needs known. Rested comfortably overnight, dropping sp02 while asleep on room air so placed on 1L NC.  Slowly titrating down levophed.     (4) walks frequently

## 2023-06-13 NOTE — ED ADULT TRIAGE NOTE - GLASGOW COMA SCALE: BEST VERBAL RESPONSE, MLM
(V5) oriented Trilobed Flap Text: The defect edges were debeveled with a #15 scalpel blade. Given the location of the defect and the proximity to free margins a trilobed flap was deemed most appropriate. Using a sterile surgical marker, an appropriate trilobed flap was drawn around the defect. The area thus outlined was incised deep to adipose tissue with a #15 scalpel blade. The skin margins were undermined to an appropriate distance in all directions utilizing iris scissors. Following this, the designed flap was carried into the primary defect and sutured into place.

## 2023-06-22 ENCOUNTER — APPOINTMENT (OUTPATIENT)
Dept: NEUROLOGY | Facility: CLINIC | Age: 28
End: 2023-06-22
Payer: COMMERCIAL

## 2023-06-22 ENCOUNTER — RESULT REVIEW (OUTPATIENT)
Age: 28
End: 2023-06-22

## 2023-06-22 VITALS
DIASTOLIC BLOOD PRESSURE: 73 MMHG | BODY MASS INDEX: 33.13 KG/M2 | SYSTOLIC BLOOD PRESSURE: 106 MMHG | HEART RATE: 72 BPM | HEIGHT: 62 IN | WEIGHT: 180 LBS

## 2023-06-22 DIAGNOSIS — H47.10 UNSPECIFIED PAPILLEDEMA: ICD-10-CM

## 2023-06-22 PROCEDURE — 99203 OFFICE O/P NEW LOW 30 MIN: CPT

## 2023-06-22 NOTE — ASSESSMENT
[FreeTextEntry1] : 27 year old woman with depression, scoliosis, referred by ophthalmology for bilateral papilledema, no complaints consistent with IIH.  Other than optic disc blurring, no neurological deficits.  \par Will need further imaging, and neuro ophthalmology evaluation. \par \par -MRI/MRV/MR orbits\par -referral to neuro ophtho placed, should follow up there\par

## 2023-06-22 NOTE — HISTORY OF PRESENT ILLNESS
[FreeTextEntry1] : 27 year old woman with hx of depression, and scoliosis, presenting to stroke neurology after referral from ophthalmology for papilledema, bilatearlly.  \par \par The patient was seeing ophtho for routine follow up, needing new glasses.  She denies having any headache, tinnitus, or visual obscurations, or notable diminution of visual fields.  Her ophthalmologist told her that she had bilateral papilledema, and increase in the physiological blind spot on visual fields testing. \par \par The patient does endorse some weight gain over the last several months.  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  She only takes sertraline, no vitamin A based therapies, and no OCP's.  \par \par She denies any neurological symptoms at this point, including headache, visual changes, cognitive difficulties, school difficulties, speech problems, weakness, or sensory changes in the upper or lower extremities.  No gait abnormalities.  No recent medical complaints, including fevers, chills, cough, shortness of breath. Only weight gain as described above.  \par \par PMHx: \par Depression on sertraline\par Scoliosis\par \par SHx: \par Denies ETOH, tobacco, and drugs. \par Medical student. \par \par On exam: \par GEN: young woman, NAD\par CV: RRR, S1, S2\par PULM: CTAB\par GI: soft, non tender\par EXTREM: no CCE\par HEENT: no trauma\par \par NEURO: \par Awake, alert, oriented, follows commands, normal fund of knowledge, normal naming, fluency, repetition, reasoning, insight, calculations, spelling own name forward and reverse, memory 3/3. \par Pupils 3-2mm, symmetric, full VF's, optic discs appear blurred, full EOM, no nystagmus, no facial weakness, no dysarthria, tongue midline, palate symmetric. \par MOTOR: normal bulk and tone.  No drift.  5/5 biceps, triceps, deltoids, , finger extension, 5/5 hip flexors, knee extensors/flexors, and ankle dorsiflex.\par SENSORY: intact symmetrically to light touch. \par COORDINATION: normal FNF\par REFLEXES: 2+ brisk symmetric, BB, BR, patellar, achilles.  Absent Goncalves.  Toes down. \par GAIT: narrow based.  negative Rhomberg. \par \par

## 2023-06-23 ENCOUNTER — APPOINTMENT (OUTPATIENT)
Dept: CARDIOLOGY | Facility: CLINIC | Age: 28
End: 2023-06-23

## 2023-06-23 DIAGNOSIS — R00.2 PALPITATIONS: ICD-10-CM

## 2023-07-06 ENCOUNTER — OUTPATIENT (OUTPATIENT)
Dept: OUTPATIENT SERVICES | Facility: HOSPITAL | Age: 28
LOS: 1 days | End: 2023-07-06
Payer: COMMERCIAL

## 2023-07-06 ENCOUNTER — APPOINTMENT (OUTPATIENT)
Dept: MRI IMAGING | Facility: CLINIC | Age: 28
End: 2023-07-06
Payer: COMMERCIAL

## 2023-07-06 DIAGNOSIS — H47.10 UNSPECIFIED PAPILLEDEMA: ICD-10-CM

## 2023-07-06 PROCEDURE — 70544 MR ANGIOGRAPHY HEAD W/O DYE: CPT | Mod: 26,59

## 2023-07-06 PROCEDURE — 70551 MRI BRAIN STEM W/O DYE: CPT | Mod: 26

## 2023-07-06 PROCEDURE — 70543 MRI ORBT/FAC/NCK W/O &W/DYE: CPT | Mod: 26

## 2023-07-06 PROCEDURE — 70544 MR ANGIOGRAPHY HEAD W/O DYE: CPT

## 2023-07-06 PROCEDURE — 70543 MRI ORBT/FAC/NCK W/O &W/DYE: CPT

## 2023-07-06 PROCEDURE — A9585: CPT

## 2023-07-06 PROCEDURE — 70551 MRI BRAIN STEM W/O DYE: CPT

## 2023-07-12 NOTE — ED BEHAVIORAL HEALTH ASSESSMENT NOTE - NS ED BHA PLAN ADMIT TO PSYCHIATRY BH CONTACTED FT
Phone call to patient. I advised the patient to make an appointment with the hand specialist and see what they recommend as [facility] does not want to order a CT scan if they would want a different test. Patient states she wants to keep coming to [facility] for the cortisone injections and will be holding off on going to see the hand specialist and is ok not doing a CT scan. No other questions or concerns at this time.   after hours

## 2023-09-05 ENCOUNTER — APPOINTMENT (OUTPATIENT)
Dept: OPHTHALMOLOGY | Facility: CLINIC | Age: 28
End: 2023-09-05

## 2023-11-14 ENCOUNTER — EMERGENCY (EMERGENCY)
Facility: HOSPITAL | Age: 28
LOS: 1 days | End: 2023-11-14
Attending: EMERGENCY MEDICINE
Payer: MEDICAID

## 2023-11-14 VITALS
SYSTOLIC BLOOD PRESSURE: 150 MMHG | DIASTOLIC BLOOD PRESSURE: 115 MMHG | OXYGEN SATURATION: 99 % | HEART RATE: 116 BPM | TEMPERATURE: 97 F | WEIGHT: 179.9 LBS | HEIGHT: 63 IN | RESPIRATION RATE: 18 BRPM

## 2023-11-14 LAB
ALBUMIN SERPL ELPH-MCNC: 3.2 G/DL — LOW (ref 3.3–5)
ALBUMIN SERPL ELPH-MCNC: 3.2 G/DL — LOW (ref 3.3–5)
ALP SERPL-CCNC: 68 U/L — SIGNIFICANT CHANGE UP (ref 40–120)
ALP SERPL-CCNC: 68 U/L — SIGNIFICANT CHANGE UP (ref 40–120)
ALT FLD-CCNC: 14 U/L — SIGNIFICANT CHANGE UP (ref 12–78)
ALT FLD-CCNC: 14 U/L — SIGNIFICANT CHANGE UP (ref 12–78)
ANION GAP SERPL CALC-SCNC: 9 MMOL/L — SIGNIFICANT CHANGE UP (ref 5–17)
ANION GAP SERPL CALC-SCNC: 9 MMOL/L — SIGNIFICANT CHANGE UP (ref 5–17)
APAP SERPL-MCNC: <2 UG/ML — LOW (ref 10–30)
APAP SERPL-MCNC: <2 UG/ML — LOW (ref 10–30)
AST SERPL-CCNC: 11 U/L — LOW (ref 15–37)
AST SERPL-CCNC: 11 U/L — LOW (ref 15–37)
BASOPHILS # BLD AUTO: 0.04 K/UL — SIGNIFICANT CHANGE UP (ref 0–0.2)
BASOPHILS # BLD AUTO: 0.04 K/UL — SIGNIFICANT CHANGE UP (ref 0–0.2)
BASOPHILS NFR BLD AUTO: 0.4 % — SIGNIFICANT CHANGE UP (ref 0–2)
BASOPHILS NFR BLD AUTO: 0.4 % — SIGNIFICANT CHANGE UP (ref 0–2)
BILIRUB DIRECT SERPL-MCNC: 0.1 MG/DL — SIGNIFICANT CHANGE UP (ref 0–0.3)
BILIRUB DIRECT SERPL-MCNC: 0.1 MG/DL — SIGNIFICANT CHANGE UP (ref 0–0.3)
BILIRUB INDIRECT FLD-MCNC: 0.2 MG/DL — SIGNIFICANT CHANGE UP (ref 0.2–1)
BILIRUB INDIRECT FLD-MCNC: 0.2 MG/DL — SIGNIFICANT CHANGE UP (ref 0.2–1)
BILIRUB SERPL-MCNC: 0.3 MG/DL — SIGNIFICANT CHANGE UP (ref 0.2–1.2)
BILIRUB SERPL-MCNC: 0.3 MG/DL — SIGNIFICANT CHANGE UP (ref 0.2–1.2)
BUN SERPL-MCNC: 16 MG/DL — SIGNIFICANT CHANGE UP (ref 7–23)
BUN SERPL-MCNC: 16 MG/DL — SIGNIFICANT CHANGE UP (ref 7–23)
CALCIUM SERPL-MCNC: 8.9 MG/DL — SIGNIFICANT CHANGE UP (ref 8.5–10.1)
CALCIUM SERPL-MCNC: 8.9 MG/DL — SIGNIFICANT CHANGE UP (ref 8.5–10.1)
CHLORIDE SERPL-SCNC: 110 MMOL/L — HIGH (ref 96–108)
CHLORIDE SERPL-SCNC: 110 MMOL/L — HIGH (ref 96–108)
CO2 SERPL-SCNC: 23 MMOL/L — SIGNIFICANT CHANGE UP (ref 22–31)
CO2 SERPL-SCNC: 23 MMOL/L — SIGNIFICANT CHANGE UP (ref 22–31)
CREAT SERPL-MCNC: 0.67 MG/DL — SIGNIFICANT CHANGE UP (ref 0.5–1.3)
CREAT SERPL-MCNC: 0.67 MG/DL — SIGNIFICANT CHANGE UP (ref 0.5–1.3)
EGFR: 124 ML/MIN/1.73M2 — SIGNIFICANT CHANGE UP
EGFR: 124 ML/MIN/1.73M2 — SIGNIFICANT CHANGE UP
EOSINOPHIL # BLD AUTO: 0.19 K/UL — SIGNIFICANT CHANGE UP (ref 0–0.5)
EOSINOPHIL # BLD AUTO: 0.19 K/UL — SIGNIFICANT CHANGE UP (ref 0–0.5)
EOSINOPHIL NFR BLD AUTO: 1.9 % — SIGNIFICANT CHANGE UP (ref 0–6)
EOSINOPHIL NFR BLD AUTO: 1.9 % — SIGNIFICANT CHANGE UP (ref 0–6)
GLUCOSE SERPL-MCNC: 118 MG/DL — HIGH (ref 70–99)
GLUCOSE SERPL-MCNC: 118 MG/DL — HIGH (ref 70–99)
HCG SERPL-ACNC: <1 MIU/ML — SIGNIFICANT CHANGE UP
HCG SERPL-ACNC: <1 MIU/ML — SIGNIFICANT CHANGE UP
HCT VFR BLD CALC: 36.2 % — SIGNIFICANT CHANGE UP (ref 34.5–45)
HCT VFR BLD CALC: 36.2 % — SIGNIFICANT CHANGE UP (ref 34.5–45)
HGB BLD-MCNC: 11.6 G/DL — SIGNIFICANT CHANGE UP (ref 11.5–15.5)
HGB BLD-MCNC: 11.6 G/DL — SIGNIFICANT CHANGE UP (ref 11.5–15.5)
IMM GRANULOCYTES NFR BLD AUTO: 0.6 % — SIGNIFICANT CHANGE UP (ref 0–0.9)
IMM GRANULOCYTES NFR BLD AUTO: 0.6 % — SIGNIFICANT CHANGE UP (ref 0–0.9)
LACTATE SERPL-SCNC: 1.1 MMOL/L — SIGNIFICANT CHANGE UP (ref 0.7–2)
LACTATE SERPL-SCNC: 1.1 MMOL/L — SIGNIFICANT CHANGE UP (ref 0.7–2)
LYMPHOCYTES # BLD AUTO: 2.04 K/UL — SIGNIFICANT CHANGE UP (ref 1–3.3)
LYMPHOCYTES # BLD AUTO: 2.04 K/UL — SIGNIFICANT CHANGE UP (ref 1–3.3)
LYMPHOCYTES # BLD AUTO: 20.1 % — SIGNIFICANT CHANGE UP (ref 13–44)
LYMPHOCYTES # BLD AUTO: 20.1 % — SIGNIFICANT CHANGE UP (ref 13–44)
MCHC RBC-ENTMCNC: 27.2 PG — SIGNIFICANT CHANGE UP (ref 27–34)
MCHC RBC-ENTMCNC: 27.2 PG — SIGNIFICANT CHANGE UP (ref 27–34)
MCHC RBC-ENTMCNC: 32 GM/DL — SIGNIFICANT CHANGE UP (ref 32–36)
MCHC RBC-ENTMCNC: 32 GM/DL — SIGNIFICANT CHANGE UP (ref 32–36)
MCV RBC AUTO: 85 FL — SIGNIFICANT CHANGE UP (ref 80–100)
MCV RBC AUTO: 85 FL — SIGNIFICANT CHANGE UP (ref 80–100)
MONOCYTES # BLD AUTO: 0.53 K/UL — SIGNIFICANT CHANGE UP (ref 0–0.9)
MONOCYTES # BLD AUTO: 0.53 K/UL — SIGNIFICANT CHANGE UP (ref 0–0.9)
MONOCYTES NFR BLD AUTO: 5.2 % — SIGNIFICANT CHANGE UP (ref 2–14)
MONOCYTES NFR BLD AUTO: 5.2 % — SIGNIFICANT CHANGE UP (ref 2–14)
NEUTROPHILS # BLD AUTO: 7.28 K/UL — SIGNIFICANT CHANGE UP (ref 1.8–7.4)
NEUTROPHILS # BLD AUTO: 7.28 K/UL — SIGNIFICANT CHANGE UP (ref 1.8–7.4)
NEUTROPHILS NFR BLD AUTO: 71.8 % — SIGNIFICANT CHANGE UP (ref 43–77)
NEUTROPHILS NFR BLD AUTO: 71.8 % — SIGNIFICANT CHANGE UP (ref 43–77)
NRBC # BLD: 0 /100 WBCS — SIGNIFICANT CHANGE UP (ref 0–0)
NRBC # BLD: 0 /100 WBCS — SIGNIFICANT CHANGE UP (ref 0–0)
PLATELET # BLD AUTO: 328 K/UL — SIGNIFICANT CHANGE UP (ref 150–400)
PLATELET # BLD AUTO: 328 K/UL — SIGNIFICANT CHANGE UP (ref 150–400)
POTASSIUM SERPL-MCNC: 3.4 MMOL/L — LOW (ref 3.5–5.3)
POTASSIUM SERPL-MCNC: 3.4 MMOL/L — LOW (ref 3.5–5.3)
POTASSIUM SERPL-SCNC: 3.4 MMOL/L — LOW (ref 3.5–5.3)
POTASSIUM SERPL-SCNC: 3.4 MMOL/L — LOW (ref 3.5–5.3)
PROT SERPL-MCNC: 7.5 G/DL — SIGNIFICANT CHANGE UP (ref 6–8.3)
PROT SERPL-MCNC: 7.5 G/DL — SIGNIFICANT CHANGE UP (ref 6–8.3)
RBC # BLD: 4.26 M/UL — SIGNIFICANT CHANGE UP (ref 3.8–5.2)
RBC # BLD: 4.26 M/UL — SIGNIFICANT CHANGE UP (ref 3.8–5.2)
RBC # FLD: 13 % — SIGNIFICANT CHANGE UP (ref 10.3–14.5)
RBC # FLD: 13 % — SIGNIFICANT CHANGE UP (ref 10.3–14.5)
SALICYLATES SERPL-MCNC: <1.7 MG/DL — LOW (ref 2.8–20)
SALICYLATES SERPL-MCNC: <1.7 MG/DL — LOW (ref 2.8–20)
SODIUM SERPL-SCNC: 142 MMOL/L — SIGNIFICANT CHANGE UP (ref 135–145)
SODIUM SERPL-SCNC: 142 MMOL/L — SIGNIFICANT CHANGE UP (ref 135–145)
TROPONIN I, HIGH SENSITIVITY RESULT: 3.1 NG/L — SIGNIFICANT CHANGE UP
TROPONIN I, HIGH SENSITIVITY RESULT: 3.1 NG/L — SIGNIFICANT CHANGE UP
WBC # BLD: 10.14 K/UL — SIGNIFICANT CHANGE UP (ref 3.8–10.5)
WBC # BLD: 10.14 K/UL — SIGNIFICANT CHANGE UP (ref 3.8–10.5)
WBC # FLD AUTO: 10.14 K/UL — SIGNIFICANT CHANGE UP (ref 3.8–10.5)
WBC # FLD AUTO: 10.14 K/UL — SIGNIFICANT CHANGE UP (ref 3.8–10.5)

## 2023-11-14 PROCEDURE — 71045 X-RAY EXAM CHEST 1 VIEW: CPT | Mod: 26

## 2023-11-14 PROCEDURE — 99285 EMERGENCY DEPT VISIT HI MDM: CPT

## 2023-11-14 PROCEDURE — 93010 ELECTROCARDIOGRAM REPORT: CPT

## 2023-11-14 RX ORDER — SODIUM CHLORIDE 9 MG/ML
1000 INJECTION INTRAMUSCULAR; INTRAVENOUS; SUBCUTANEOUS ONCE
Refills: 0 | Status: COMPLETED | OUTPATIENT
Start: 2023-11-14 | End: 2023-11-14

## 2023-11-14 RX ORDER — DIAZEPAM 5 MG
10 TABLET ORAL ONCE
Refills: 0 | Status: DISCONTINUED | OUTPATIENT
Start: 2023-11-14 | End: 2023-11-14

## 2023-11-14 RX ADMIN — SODIUM CHLORIDE 1000 MILLILITER(S): 9 INJECTION INTRAMUSCULAR; INTRAVENOUS; SUBCUTANEOUS at 20:53

## 2023-11-14 RX ADMIN — SODIUM CHLORIDE 1000 MILLILITER(S): 9 INJECTION INTRAMUSCULAR; INTRAVENOUS; SUBCUTANEOUS at 22:25

## 2023-11-14 NOTE — ED ADULT NURSE NOTE - NSFALLHARMRISKINTERV_ED_ALL_ED
Assistance OOB with selected safe patient handling equipment if applicable/Communicate risk of Fall with Harm to all staff, patient, and family/Monitor for mental status changes and reorient to person, place, and time, as needed/Move patient closer to nursing station/within visual sight of ED staff/Provide patient with walking aids/Provide visual cue: red socks, yellow wristband, yellow gown, etc/Reinforce activity limits and safety measures with patient and family/Toileting schedule using arm’s reach rule for commode and bathroom/Use of alarms - bed, stretcher, chair and/or video monitoring/Bed in lowest position, wheels locked, appropriate side rails in place/Call bell, personal items and telephone in reach/Instruct patient to call for assistance before getting out of bed/chair/stretcher/Non-slip footwear applied when patient is off stretcher/[place] to call system/Physically safe environment - no spills, clutter or unnecessary equipment/Purposeful Proactive Rounding/Room/bathroom lighting operational, light cord in reach

## 2023-11-14 NOTE — ED ADULT NURSE NOTE - OBJECTIVE STATEMENT
Brought in by EMS  accompanied by parents reports patient had overdose of Doxepin-as per Mom bottle is empty. Patient on arrival is sleepy but arousable to tactile stimuli. AS per Mom patient was well around 4pm and was able to drink tea and have some pizza. At around 7pm patient noted to be sleepy/ drowsy and noted to have slurred speech. Mom states she took her HR @ 120's and then called ambulance. Patient initially at times agitated when being touch.

## 2023-11-14 NOTE — ED PROVIDER NOTE - PROGRESS NOTE DETAILS
patient sleeping. 3 EKGs with stable QRS. d/w poison control. can do repeat EKG in 4 hours unless patient's condition changes. awaiting ASA, tylenol and alcohol level. Patient sleeping. Mom states she awoke and asked if she was at [facility]. s/o Dr. Neil. to continue to monitor. medicine admission vs psych eval in the am if patient becomes more alert over the next few hours. pt seen and evaluated by telepsych, cleared for dc

## 2023-11-14 NOTE — ED ADULT NURSE NOTE - MODE OF DISCHARGE
Problem: Communication  Goal: The ability to communicate needs accurately and effectively will improve  Patient is able to call appropriately for any and all needs.    Problem: Safety  Goal: Will remain free from injury  Patient educated regarding fall precautions and verbalized understanding. Non slip socks on patient. Bed alarm is on. Patient verbalized understanding regarding use of call light for assistance. Mobility assessed and proper sign placed on door.          Ambulatory

## 2023-11-14 NOTE — ED PROVIDER NOTE - CLINICAL SUMMARY MEDICAL DECISION MAKING FREE TEXT BOX
[No Acute Distress] : no acute distress [Well Nourished] : well nourished [Well Developed] : well developed [Well-Appearing] : well-appearing [Normal Sclera/Conjunctiva] : normal sclera/conjunctiva [PERRL] : pupils equal round and reactive to light [EOMI] : extraocular movements intact [Normal Outer Ear/Nose] : the outer ears and nose were normal in appearance [Normal Oropharynx] : the oropharynx was normal [No JVD] : no jugular venous distention [No Lymphadenopathy] : no lymphadenopathy [Supple] : supple [Thyroid Normal, No Nodules] : the thyroid was normal and there were no nodules present [No Respiratory Distress] : no respiratory distress  [No Accessory Muscle Use] : no accessory muscle use [Clear to Auscultation] : lungs were clear to auscultation bilaterally [Normal Rate] : normal rate  TCA overdose. cardiac monitor, labs, CXR, rectal temp. case d/w poison control who recommends serial EKGs with QRS monitoring, monitor temp for serotonin syndrome. [Regular Rhythm] : with a regular rhythm [Normal S1, S2] : normal S1 and S2 [No Murmur] : no murmur heard [No Carotid Bruits] : no carotid bruits [No Abdominal Bruit] : a ~M bruit was not heard ~T in the abdomen [No Varicosities] : no varicosities [Pedal Pulses Present] : the pedal pulses are present [No Edema] : there was no peripheral edema [No Palpable Aorta] : no palpable aorta [No Extremity Clubbing/Cyanosis] : no extremity clubbing/cyanosis [Soft] : abdomen soft [Non Tender] : non-tender [Non-distended] : non-distended [No Masses] : no abdominal mass palpated [No HSM] : no HSM [Normal Bowel Sounds] : normal bowel sounds [Normal Posterior Cervical Nodes] : no posterior cervical lymphadenopathy [Normal Anterior Cervical Nodes] : no anterior cervical lymphadenopathy [No CVA Tenderness] : no CVA  tenderness [No Spinal Tenderness] : no spinal tenderness [No Joint Swelling] : no joint swelling [Grossly Normal Strength/Tone] : grossly normal strength/tone [No Rash] : no rash [Coordination Grossly Intact] : coordination grossly intact [No Focal Deficits] : no focal deficits [Normal Gait] : normal gait [Normal Affect] : the affect was normal [Normal Insight/Judgement] : insight and judgment were intact

## 2023-11-14 NOTE — ED PROVIDER NOTE - PATIENT PORTAL LINK FT
You can access the FollowMyHealth Patient Portal offered by [facility] by registering at the following website: [email or web address]. By joining [employer]’s FollowMyHealth portal, you will also be able to view your health information using other applications (apps) compatible with our system.

## 2023-11-14 NOTE — ED PROVIDER NOTE - OBJECTIVE STATEMENT
Patient BIBEMS from home with her parents. Patient with hx of depression on doxepin 75mg daily and sertraline 100mg BID. Both rx were filled 10/30. Patient goes to school [employer] and she returned home yesterday. was doing fine, helping her mother clean etc. mom went to work last night and returned this morning and noted she was very sleepy this morning. she went to sleep as well. At 4:30p.m. patient awoke and had tea and something to eat and then became sleepy and had slurred speech. hx of advil OD in april. mom brought in bottles of her meds. doxepin bottle is empty and mom states she doesn't always take the meds secondary to getting sleepy from them. sertraline bottle has 38 pills in it left. unable to obtain hx from patient.

## 2023-11-14 NOTE — ED ADULT TRIAGE NOTE - CHIEF COMPLAINT QUOTE
Patient brought in by ambulance from home as reported overdose on medication  taking doxepine (bottle Is empty); sertraline took advil as per mother. Patient has been sleepy since yesterday. Unable to answer questions.

## 2023-11-14 NOTE — ED PROVIDER NOTE - CARE PLAN
1 Principal Discharge DX:	TCA (tricyclic antidepressant) overdose of undetermined intent, initial encounter

## 2023-11-14 NOTE — ED ADULT NURSE REASSESSMENT NOTE - NS ED NURSE REASSESS COMMENT FT1
Patient is sleeping, not in any distress nor agitation. Restraint discontinued. Dr. Baker made aware.

## 2023-11-14 NOTE — ED ADULT TRIAGE NOTE - HEIGHT IN INCHES
Partially impaired: cannot see medication labels or newsprint, but can see obstacles in path, and the surrounding layout; can count fingers at arm's length 3

## 2023-11-15 VITALS
HEART RATE: 112 BPM | OXYGEN SATURATION: 100 % | DIASTOLIC BLOOD PRESSURE: 60 MMHG | SYSTOLIC BLOOD PRESSURE: 101 MMHG | RESPIRATION RATE: 16 BRPM

## 2023-11-15 LAB
AMPHET UR-MCNC: NEGATIVE — SIGNIFICANT CHANGE UP
AMPHET UR-MCNC: NEGATIVE — SIGNIFICANT CHANGE UP
BARBITURATES UR SCN-MCNC: NEGATIVE — SIGNIFICANT CHANGE UP
BARBITURATES UR SCN-MCNC: NEGATIVE — SIGNIFICANT CHANGE UP
BENZODIAZ UR-MCNC: NEGATIVE — SIGNIFICANT CHANGE UP
BENZODIAZ UR-MCNC: NEGATIVE — SIGNIFICANT CHANGE UP
COCAINE METAB.OTHER UR-MCNC: NEGATIVE — SIGNIFICANT CHANGE UP
COCAINE METAB.OTHER UR-MCNC: NEGATIVE — SIGNIFICANT CHANGE UP
METHADONE UR-MCNC: NEGATIVE — SIGNIFICANT CHANGE UP
METHADONE UR-MCNC: NEGATIVE — SIGNIFICANT CHANGE UP
OPIATES UR-MCNC: NEGATIVE — SIGNIFICANT CHANGE UP
OPIATES UR-MCNC: NEGATIVE — SIGNIFICANT CHANGE UP
PCP SPEC-MCNC: SIGNIFICANT CHANGE UP
PCP SPEC-MCNC: SIGNIFICANT CHANGE UP
PCP UR-MCNC: NEGATIVE — SIGNIFICANT CHANGE UP
PCP UR-MCNC: NEGATIVE — SIGNIFICANT CHANGE UP
THC UR QL: NEGATIVE — SIGNIFICANT CHANGE UP
THC UR QL: NEGATIVE — SIGNIFICANT CHANGE UP

## 2023-11-15 PROCEDURE — 83605 ASSAY OF LACTIC ACID: CPT

## 2023-11-15 PROCEDURE — 93010 ELECTROCARDIOGRAM REPORT: CPT

## 2023-11-15 PROCEDURE — 80076 HEPATIC FUNCTION PANEL: CPT

## 2023-11-15 PROCEDURE — 84484 ASSAY OF TROPONIN QUANT: CPT

## 2023-11-15 PROCEDURE — 71045 X-RAY EXAM CHEST 1 VIEW: CPT

## 2023-11-15 PROCEDURE — 36415 COLL VENOUS BLD VENIPUNCTURE: CPT

## 2023-11-15 PROCEDURE — 82962 GLUCOSE BLOOD TEST: CPT

## 2023-11-15 PROCEDURE — 85025 COMPLETE CBC W/AUTO DIFF WBC: CPT

## 2023-11-15 PROCEDURE — 99285 EMERGENCY DEPT VISIT HI MDM: CPT | Mod: 25

## 2023-11-15 PROCEDURE — 93005 ELECTROCARDIOGRAM TRACING: CPT

## 2023-11-15 PROCEDURE — 96360 HYDRATION IV INFUSION INIT: CPT

## 2023-11-15 PROCEDURE — 80048 BASIC METABOLIC PNL TOTAL CA: CPT

## 2023-11-15 PROCEDURE — 96361 HYDRATE IV INFUSION ADD-ON: CPT

## 2023-11-15 PROCEDURE — 84702 CHORIONIC GONADOTROPIN TEST: CPT

## 2023-11-15 PROCEDURE — 80307 DRUG TEST PRSMV CHEM ANLYZR: CPT

## 2023-11-15 NOTE — ED BEHAVIORAL HEALTH PROGRESS NOTE - CASE SUMMARY/FORMULATION (CLEARLY DOCUMENT RATIONALE FOR DISPOSITION CHANGE)
29 yo female, on leave from medical school (completed 2 years), currently staying with parents but going back and forth between their place and her own apartment in [place] (where she attends school), one prior admission in May 2023 after pt took an impulsive overdose, BIB EMS referred by parents for appearing more drowsy/lethargic after taking a few extra doxepin. Patient denying any suicidal ideation or intention and parents corroborate they do not think so either and do not have safety concerns. They also corroborate patient has been incrementally improving since recent medication change. Patient on exam presents well related with euthymic affect, stable, not in distress, and denies significant recent depression. Demonstrates optimistic outlook about future. While it is possible she may be minimizing some of her symptoms or unable to recognize fully her own fluctuations in mood at this time, she does not present an acute danger and does not meet criteria for involuntary hospitalization.    - return home, continue follow up with psychiatrist Dr. Leger  - also has follow up with her therapist tomorrow. Mom is aware and will ensure compliance.

## 2023-11-15 NOTE — ED ADULT NURSE REASSESSMENT NOTE - NS ED NURSE REASSESS COMMENT FT1
1930: received patient, sitting upright in stretcher, smiling.  calm.  parents at bedside.  asking to eat breakfast.  informed them I will check with MD and let them know.  CO continues.  (Checked with MD about meals, and she can eat.  informed patient / family breakfast comes around at approximately 8:15-8:30)  dustin carias

## 2023-11-15 NOTE — ED BEHAVIORAL HEALTH NOTE - BEHAVIORAL HEALTH NOTE
Patient gives permission to obtain collateral from mother Masterson:  (  ) Yes  ( x )  No  Rationale for overriding objection            (  ) Lack of capacity. Details: ________            ( x ) Assessing risk of danger to self/others. Details: Pt brought in for ingestion, need to assess for safety.  Rationale for selecting specific collateral source            (  ) Potential to impact risk of danger to self/others and no alternative equivalent. Details: _____    Collateral (Mercy, mother) has requested that the information provided remain confidential: Yes [ x ] No [  ]  Collateral (Mercy, mother) has provided information that patient is/may be unaware of: Yes [  ] No [ x ]       FOR EACH PERSON  •	NAME: Aleja  •	NUMBER: [phone]  •	RELATIONSHIP: Mother  •	RELIABILITY: Reliable  •	COMMENTS: Mother reports no safety concerns. Mother reports she believes this may have been an accidental ingestion, pt endorsed to mother in ED she took 4-5 tabs of doxepin to help her sleep. Mother advocating to discontinue doxepin.      DEMOGRAPHICS 27 yo F, single, lives alone (in [place]), currently visiting parents in , unemployed, non-caregiver.     HPI (SEE TIMELINE ABOVE)  •	BASELINE FUNCTIONING: perfectionist, able to care for self, enjoys doing community work, active  •	DATE HPI STARTED: 11/14  •	DECOMPENSATION: Mother reports she finished a shift in the morning and came home to pt sound asleep. Mother reports pt would intermittently wake up when aroused by mother but appeared to be lethargic. Mother reports around 4:30PM, pt asked mother to make her tea, came downstairs, but was falling asleep, appearing drowsy and lethargic. Mother report father came home shortly after, mother checked pt’s vitals and noted an increased HR, and parents activated 911. Mother reports pt endorsed taking an additional 4-5 tabs of doxepin to help her sleep, however mother is not certain what or how much medication pt took . Mother reports pt’s bottle of doxepin was found to be empty. Mother reports pt’s sertraline had the correct amount of tablets. Mother denies any noticeable behavioral changes. Mother reports pt lives in [place] but came back to parent’s home on Saturday to prepare for Thanksgiving. Mother reports since Saturday, pt has appeared to be at baseline.   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 Mother reports pt has endorsed issues with sleep and difficulty falling asleep however pt has been getting decent sleep for the past 3 nights.   •	SUICIDALITY: Pt had an accidental ingestion of doxepin. Mother reports pt denies SI and pt has not endorsed SI recently.  •	VIOLENCE: Denies   •	SUBSTANCE: Denies     PAST PSYCHIATRIC HISTORY    •	DATE PAST PSYCHIATRIC HISTORY STARTED: 2023  •	MAIN PSYCHIATRIC DIAGNOSIS: MDD  •	PSYCHIATRIC HOSPITALIZATIONS: 1 prior hospitalization at [facility] around 04/2023 for SA via ingestion of Advil  •	PRIOR ILLNESS: Mother reports pt is in outpatient care with psychiatrist: Dr. Dez Leger via telehealth and therapist: Angle at [facility] ([place] 1x/week via telehealth. Mother reports pt is medication and tx compliant.   •	SUICIDALITY: 1 prior SA via ingestion of Advil, no NSSIB   •	VIOLENCE: Denies   •	SUBSTANCE USE: Denies      OTHER HISTORY  •	CURRENT MEDICATION: Sertraline 100mg BID, Doxepin 75mg BID  •	MEDICAL HISTORY: None known  •	ALLERGIES: NKA  •	LEGAL ISSUES: None known  •	FIREARM ACCESS: Denies  •	SOCIAL HISTORY: None known  •	FAMILY HISTORY: None known

## 2023-11-15 NOTE — ED ADULT NURSE REASSESSMENT NOTE - NS ED NURSE REASSESS COMMENT FT1
0910:  per , he would like to speak to MD, as he would like patient to be discharged.  MD made aware, and he will go to bedside to speak to spouse / family.  dustin carias

## 2023-11-15 NOTE — ED BEHAVIORAL HEALTH ASSESSMENT NOTE - HPI (INCLUDE ILLNESS QUALITY, SEVERITY, DURATION, TIMING, CONTEXT, MODIFYING FACTORS, ASSOCIATED SIGNS AND SYMPTOMS)
28 yr old female with PPHx of Depression with 1 inpatient admission with SA in may 2023 was BIB EMS in context of ?OD/SA.    Patient reported hx of depression and had a SA in May 2023. 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 She came back to home from [place] and is living with her parents. She reported current stressors of interpersonal relationship issues with her parents and siblings and was invited for a wedding last week where she did not go because she would have faced a male their whom she had feelings towards. She stated she was attempting to communicate with her father day before yesterday but she felt it was not going in right manner and she felt more stressed so she decided to take 3-5 pills of Doxepin to get good sleep to feel rested. She stated she is prescribed Doxepin 75 mg for sleep with which she sleeps around 3 AM and wakes up around 9-11 AM. Day before Yesterday, she wanted to get a better sleep so she took 3-5 pills around 7 PM and then ate something and went to bed. Her mother found her too sedated next day so she called EMS and brought her to ED. Patient stated it was not a SA as she just wanted to "sleep over my problems and wake up next day." She stated she did not thought about the possibility of lethality of the OD and thought that she would wake up next day. She stated her mood is overall "calm" lately, appetite and sleep are fluctuating. She stated her motivation has improved lately as her psychiatrist increased the dose of Zoloft to 200 mg in last month. She denied active SI/HI. She reported her plan as to return back to [place] to get away from family stress. When offered the voluntary hospitalization, but she refused. She stated there is no risk of safety with her.     Refer to SW note for collaterals from mother

## 2023-11-15 NOTE — ED ADULT NURSE REASSESSMENT NOTE - NS ED NURSE REASSESS COMMENT FT1
0830:  patient sitting upright and eating.  HR upt to 128.  Tele called.  patient comfortable.  will continue to monitor.  dustin carias.

## 2023-11-15 NOTE — ED BEHAVIORAL HEALTH PROGRESS NOTE - SAFETY PLAN ADDT'L DETAILS
Safety plan discussed with.../Education provided regarding environmental safety / lethal means restriction/Provision of National Suicide Prevention Lifeline 3-995-824-WTUI ([id number])

## 2023-11-15 NOTE — ED BEHAVIORAL HEALTH ASSESSMENT NOTE - SUMMARY
28 yr old female with PPHx of Depression with 1 inpatient admission with SA in may 2023 was BIB EMS in context of ?OD/SA.

## 2023-11-15 NOTE — ED BEHAVIORAL HEALTH PROGRESS NOTE - RISK ASSESSMENT
Case was discussed w/ outpatient psychiatrist. While pt remains at chronically elevated risk given past self-harm/impulsivity and ongoing psychosocial stressors, she is not meeting criteria for involuntary hospitalization. Neither psychiatrist nor parents have acute safety concerns, patient is denying SI, and pt is demonstrating signs of ongoing improvement.

## 2023-11-15 NOTE — BH SAFETY PLAN - LOCAL URGENT CARE ADDRESS
[id number] [place] [address], [facility], First Floor, [place] [address], [facility] [[email or web address]]

## 2023-11-15 NOTE — ED BEHAVIORAL HEALTH ASSESSMENT NOTE - REASON
Obtain collaterals from outpatient psychiatrist and therapist to guage risk and safety planning which will offer more confidence with decision of admit vs discharge

## 2023-11-15 NOTE — ED BEHAVIORAL HEALTH PROGRESS NOTE - SUMMARY
moderately elevated acute risk in setting of depression and active stressors. Mitigated by being in treatment. Recommend collateral from OP psychiatrist.

## 2023-11-15 NOTE — ED BEHAVIORAL HEALTH ASSESSMENT NOTE - RISK ASSESSMENT
Acute risk is elevated given active depression and active stressors. Mitigated by being in treatment.

## 2023-11-15 NOTE — ED ADULT NURSE REASSESSMENT NOTE - NS ED NURSE REASSESS COMMENT FT1
Patient ambulated to the bathroom. Urine obtained and sent to lab. Patient woke up, alert and oriented x 4. Patient ambulated to the bathroom. Urine obtained and sent to lab.

## 2023-11-15 NOTE — ED ADULT NURSE REASSESSMENT NOTE - NS ED NURSE REASSESS COMMENT FT1
Patient awake at this time with mother at the bedside informed of the plan of care for patient waiting for telepsych evaluation

## 2023-11-15 NOTE — ED BEHAVIORAL HEALTH NOTE - BEHAVIORAL HEALTH NOTE
==================             PRE-HOSPITAL COURSE             ===================            SOURCE:  Secondhand EMR documentation.             DETAILS:  Patient BIBEMS; chief complaint of overdose.           ===========      ED COURSE:            ===========             SOURCE:  RN and secondhand EMR documentation.              ARRIVAL:  Patient noted to be uncooperative with ED protocol and agitated upon arrival. Patient gowned, wanded, and placed in private room on 1:1 for consult. Patient presents with good hygiene/grooming.       BELONGINGS:  None notable.             BEHAVIOR: Blood/urine provided for routine labs without noted incident. No SI/HI/AH/VH elicited per RN. Patient is alert, oriented, and makes eye contact; speech of normal volume and rate accompanied by logical thought process. Patient has been in hospital bed while in ED; presents as calm and interacting appropriately with ED staff. RN notes agitated earlier in ED.        TREATMENT: Patient required four point restraints, has been calmer since removed.     Visitors: Patient presently accompanied by mother while in ED.

## 2023-11-15 NOTE — ED BEHAVIORAL HEALTH PROGRESS NOTE - STANDING MEDS RECEIVED IN ED?
No
Detail Level: Detailed
Quality 130: Documentation Of Current Medications In The Medical Record: Current Medications Documented
Quality 111:Pneumonia Vaccination Status For Older Adults: Pneumococcal Vaccination Previously Received

## 2023-11-15 NOTE — ED BEHAVIORAL HEALTH ASSESSMENT NOTE - OTHER PAST PSYCHIATRIC HISTORY (INCLUDE DETAILS REGARDING ONSET, COURSE OF ILLNESS, INPATIENT/OUTPATIENT TREATMENT)
Dx with depression  1 Inpatient admission in May 2023 for OD  1 SA  seeing Dr. Dez Leger for meds and SW for therapy

## 2023-11-15 NOTE — ED BEHAVIORAL HEALTH PROGRESS NOTE - DETAILS:
I spoke with Dr. Leger who expresses some concern that patient has a tendency to minimize her depressive symptoms. He believes mostly she has been the same, last saw her about 2 weeks ago. He thinks it is helpful now that patient is spending more time at home with her parents, rather than being isolated in [place]. 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 He thinks voluntary admission would be helpful, but also feels discharge would be appropriate if patient and family decline admission, so long as psychoeducation is provided to parents about patient's risk, and their need to be vigilant is communicated.    On interview the patient is very calm and well related, in no acute emotional distress. She exhibits euthymic and full range of affect. She again reiterates that she did not intend to harm herself at all, that she feels sometimes doxepin helps with sleep and other times it doesn't, so she took a few more to try to fall asleep. Patient denies feeling significantly depressed currently. She says she has been engaging more in activities like buying plants, watching movies, listening to music, getting back into exercising, and also reports relationships with her family and sister have been improving. Patient says reflecting on the time in May, she recognized in retrospect that she was going through significant depression because she could not function well academically, lacked motivation to do anything, and at the time felt burnt out. Denies feeling this way now. She also says she thinks now that the overdose at the time was to communicate to her family that she was struggling, and she didn't have an intent to die although felt impulsive and, in the moment, did not care what happened.  Patient reports now she "came to peace" with the fact that medicine may not be right for her, and she wants to pursue what makes her happy. She is open to either returning to medicine or pursuing a different interest like medicine or software engineering. Patient reports that overall, she feels she has been doing much better since her Zoloft was increased to 200mg over the past few months, and she reports her parents have noticed the change as well. Patient has appointment with her therapist tomorrow and intends to go, says she has found it quite helpful and has a good therapeutic relationship.    I spoke again to patient's mother Mercy who continues to feel very safe with patient being discharged, and does not have any safety concerns. I reiterated the patient's risks, including the possibility that she could in the future escalate to more dangerous self-harm or act in an impulsive manner. Mother corroborates she also thinks patient has improved in the past few months. Mom says she will monitor patient taking medication appropriately.

## 2023-11-20 ENCOUNTER — NON-APPOINTMENT (OUTPATIENT)
Age: 28
End: 2023-11-20

## 2024-08-07 NOTE — ED BEHAVIORAL HEALTH ASSESSMENT NOTE - CURRENT PASSIVE IDEATION:
[Initial Plan] : Initial Plan [Cognitively intact] : cognitively intact [In good health] : in good health [FreeTextEntry3] : 8/7/24 [Mental Health] : Mental Health [Initial] : Initial [every ___ weeks] : every [unfilled] weeks [Yes] : Yes [Psychiatric Provider/Prescriber] : Psychiatric Provider/Prescriber [FreeTextEntry4] : I need support [de-identified] : pt will report 3 effective coping skills when feeling overwhelmed [de-identified] : 2mo [de-identified] : 2mo [de-identified] : pt will reports feeling 50% stress reduction each day [Date of Last Physical Exam: _____] : Date of Last Physical Exam: [unfilled] [Date of Last Annual Labs: _____] : Date of Last Annual Labs: [unfilled] [Low acute suicide risk] : Low acute suicide risk [No] : No [FreeTextEntry1] : despite distress, pt is at low acute suicide risk, adriano as she has sense of obligation to care for her son, she is help-seeking, motivated for tx.   Yes